# Patient Record
Sex: FEMALE | Race: WHITE | Employment: FULL TIME | URBAN - METROPOLITAN AREA
[De-identification: names, ages, dates, MRNs, and addresses within clinical notes are randomized per-mention and may not be internally consistent; named-entity substitution may affect disease eponyms.]

---

## 2021-06-21 ENCOUNTER — HOSPITAL ENCOUNTER (OUTPATIENT)
Dept: PHYSICAL THERAPY | Age: 56
Discharge: HOME | End: 2021-06-21
Payer: COMMERCIAL

## 2021-06-21 PROCEDURE — 97110 THERAPEUTIC EXERCISES: CPT | Performed by: PHYSICAL THERAPIST

## 2021-06-21 PROCEDURE — 97162 PT EVAL MOD COMPLEX 30 MIN: CPT | Performed by: PHYSICAL THERAPIST

## 2021-06-21 NOTE — PROGRESS NOTES
PT  EVAL AND TREATMENT    Patient Name: Charles Hernandez  Date:2021  : 1965  [x]  Patient  Verified  Payor: Wilbur Earing / Plan: Doll Ingles / Product Type: HMO /    In time:1030am  Out time:1123  Total Treatment Time (min): 53  Total Timed Codes (min): 53  1:1 Treatment Time ( only): 48   Visit #: 1 of 8-10    Treatment Area: Acute bilateral low back pain with sciatica [M54.40]  Pain in: 2  Pain out 3    Objective evaluation:  Physical Therapy Evaluation - Lumbar Spine ([facility])    SUBJECTIVE  Chief Complaint: Pt present with c/o low back pain radiating into the L buttock, which began about 1.5 yrs ago after bending fwd and painting cabinets. Denies pain in post thigh to knee. Pt reports pain increases with bending fwd (vacuuming, cleaning bathroom), prolonged sitting and sleeping, and decreases with walking, and changing positions. Sleeps on R side with pillow between knees. Pt states pain has been constant since the incident 1.5 yrs ago. X-rays reveal beginning states of OA. Denies numbness/tingling. Pain is throbbing in nature. Pain levels range from 3/4 to 6/7. Prior treatments; massage therapy (1 visit). Negative for red flags   Pt reports walking every other day, approx 1 hr at a time with no increase in sx.      OBJECTIVE  Posture:  Lateral Shift: [] R    [] L     [] +  [x] -  Kyphosis: [] Increased [] Decreased   [x]  WNL  Lordosis:  [] Increased [] Decreased   [x] WNL  Pelvic symmetry: [] WNL    [] Other:  Gait:  [x] Normal     [] Abnormal:    Active Movements: [] N/A   [] Too acute   [] Other:  ROM % AROM % PROM Comments:pain, area   Forward flexion 40-60 Finger tips 12 in   Mild increase in l/s pain    Extension 20-30 50%  decrease   SB right 20-30 75%     SB left 20-30 75%  Mild increase   Rotation right 5-10 WNL      Rotation left 5-10 WNL  tightness     Repeated Movements : EDU: decreased sx, RFIS: increases sx, static positioning: KAIN  Neuro Screen [x] WNL  Dural Mobility:  SLR Sitting: [x] R    [x] L    [] +    [x] -  @ (degrees):           Supine: [] R    [] L    [] +    [] -  @ (degrees):   Slump Test: [] R    [x] L    [x] +    [] -  @ (degrees): end range  Prone Knee Bend: [x] R    [x] L    [x] +    [] -  mild  Palpation  [] Min  [x] Mod  [] Severe    Location: L piriformis mm , L4, L5,S1 PA glides TTP, L QL mm  Stabilization Tests  Multifidus Test  Strength: glut max: B 4,   Special Tests  Lumbar:  Quadrant:  [x] Pos  [] Neg   [] Flex  [x] Ext (L)  Sacroilliac: (L LE slightly shorter, negative long sit test. Tightness in L QL mm )          Hip: Alcus Cinnamon:  [] R    [x] L    [x] +    [] - mild    Piriformis: [] R    [x] L    [x] +    [] -          Deficits: Eron's: [] R    [] L    [] +    [x] -     Homero: [x] R    [x] L    [x] +    [] - very mild    Hamstrings 90/90: mod tightness B    Gastrocsoleus (to neutral): Right: Left:             Justification for Eval Code Complexity:  Patient History : Thyroid, alcohol use  Examination see exam as above   Clinical Presentation: evolving  Clinical Decision Making : FOTO : 70 /100    Patient Education: [x] Established HEP    [x] POT (minutes) :15 min HEP charged as therex    ASSESSMENT  [x]  See Plan of Care    PLAN  [x]  Upgrade activities as tolerated     [x] Other:_ POC  Patient to be seen 2 /wk for 8-10 treatments.        Minesh Beltran, ANNMARIE 6/21/2021  10:08 AM

## 2021-06-21 NOTE — PROGRESS NOTES
[id number] [facility] AT [facility]  [address] [place] [address],[address], [address], [address]  Phone [phone]  Fax [phone] / [address] [address] PHYSICAL THERAPY SERVICES  Patient Name: Tosha Beasley : 1965   Medical   Diagnosis: Acute bilateral low back pain with sciatica [M54.40] Treatment Diagnosis: L/s pain with sciatica   Onset Date: 1.5 yrs ago     Referral Source: Rose Claros Start of Care [facility]): 2021   Prior Hospitalization: See medical history Provider #: [id number]   Prior Level of Function: Indep   Comorbidities: Thyroid problems, tobacco use. Medications: Verified on Patient Summary List   The Plan of Care and following information is based on the information from the initial evaluation.   =================================================================================  Assessment / key information:  Patient is a 54 y.o. female who presents to [facility] with Dx of acute low back pain with sciatica. Pt present with c/o low back pain radiating into the L buttock, which began about 1.5 yrs ago after bending fwd and painting cabinets. Denies pain in post thigh to knee. Pt reports pain increases with bending fwd (vacuuming, cleaning bathroom), prolonged sitting and sleeping, and decreases with walking, and changing positions. Sleeps on R side with pillow between knees. Pt states pain has been constant since the incident 1.5 yrs ago. X-rays reveal beginning states of OA. Denies numbness/tingling. Pain is throbbing in nature. Pain levels range from 3/4 to 6/7. Prior treatments; massage therapy (1 visit). Negative for red flags   Pt reports walking every other day, approx 1 hr at a time with no increase in sx.    OBJECTIVE  Posture:  Lateral Shift: [] R    [] L     [] +  [x] -  Kyphosis: [] Increased [] Decreased   [x]  WNL  Lordosis:  [] Increased [] Decreased   [x] WNL  Pelvic symmetry: [] WNL    [] Other:  Gait:  [x] Normal     [] Abnormal:    Active Movements: [] N/A   [] Too acute   [] Other:  ROM % AROM % PROM Comments:pain, area   Forward flexion 40-60 Finger tips 12 in   Mild increase in l/s pain    Extension 20-30 50%  decrease   SB right 20-30 75%     SB left 20-30 75%  Mild increase   Rotation right 5-10 WNL      Rotation left 5-10 WNL  tightness     Repeated Movements : EDU: decreased sx, RFIS: increases sx, static positioning: KAIN mild increase in compressive forces. Neuro Screen [x] WNL  Dural Mobility:  SLR Sitting: [x] R    [x] L    [] +    [x] -  @ (degrees):           Supine: [] R    [] L    [] +    [] -  @ (degrees):   Slump Test: [] R    [x] L    [x] +    [] -  @ (degrees): end range  Prone Knee Bend: [x] R    [x] L    [x] +    [] -  mild  Palpation  [] Min  [x] Mod  [] Severe    Location: L piriformis mm , L4, L5,S1 PA glides TTP, L QL mm  Stabilization Tests  Multifidus Test  Strength: glut max: B 4,   Special Tests  Lumbar:  Quadrant:  [x] Pos  [] Neg   [] Flex  [x] Ext (L)  Sacroilliac: (L LE slightly shorter, negative long sit test. Tightness in L QL mm )          Hip: Gilles Reagan:  [] R    [x] L    [x] +    [] - mild    Piriformis: [] R    [x] L    [x] +    [] -          Deficits: Eron's: [] R    [] L    [] +    [x] -     Homero: [x] R    [x] L    [x] +    [] - very mild    Hamstrings 90/90: mod tightness B     Patient scored 70 on FOTO indicating decreased functional activity level and QOL. A home exercise program was demonstrated and provided to address the above objective and functional deficits.  Patient can benefit from PT interventions to improve AROM, strength/atability, decrease pain and radicular sx, to facilitate ADLs & overall functional status.   =================================================================================  Eval Complexity: History: MEDIUM  Complexity : 1-2 comorbidities / personal factors will impact the outcome/ POC Exam:MEDIUM Complexity : 3 Standardized tests and measures addressing body structure, function, activity limitation and / or participation in recreation  Presentation: MEDIUM Complexity : Evolving with changing characteristics  Clinical Decision Making:MEDIUM Complexity : FOTO score of 26-74Overall Complexity:MEDIUM  Problem List: pain affecting function, decrease ROM, decrease strength, decrease ADL/ functional abilitiies, decrease activity tolerance and decrease flexibility/ joint mobility   Treatment Plan may include any combination of the following: Therapeutic exercise, Therapeutic activities, Neuromuscular re-education, Physical agent/modality, Gait/balance training, Manual therapy, Aquatic therapy, Patient education, Self Care training, Functional mobility training, Home safety training and Stair training  Patient / Family readiness to learn indicated by: asking questions, trying to perform skills and interest  Persons(s) to be included in education: patient (P)  Barriers to Learning/Limitations: None  Measures taken:    Patient Goal (s): Learn new strategies to reduce pain   Patient self reported health status: good  Rehabilitation Potential: good   Short Term Goals: To be accomplished in  2  weeks:  1. Patient will be compliant with HEP for sx management to address the above listed deficits. Levi Narayan Long Term Goals: To be accomplished in  8-10  treatments:  1. Patient to be independent & compliant with HEP in preparation for D/C.   2. Patient to increase FOTO score to 76 indicating improved functional abilities and QOL. 3. Patient to report avg pain <= to 2 to facilitate fwd bending chores and reduced sleep disturbances. 4. Patient to report 75% reduction in post gluteal pain and sx, indicting centralization or decreased nerve irritations. 5. Patient to be [id number] in and demo proper lifting techniques to reduce stress to l/s.    Frequency / Duration:   Patient to be seen  2  times per week for 8-10  treatments:  Patient / Caregiver education and instruction: activity modification and exercises    Therapist Signature: Chucho Perkins PT Date: 8/03/2860   Certification Period:  Time: 10:09 AM   ===========================================================================================  I certify that the above Physical Therapy Services are being furnished while the patient is under my care. I agree with the treatment plan and certify that this therapy is necessary. Physician Signature:        Date:       Time:     Please sign and return to [facility] or you may fax the signed copy to [phone]. Thank you.                                         Danny Muhammad,*    Allergies checked: y

## 2021-06-24 ENCOUNTER — HOSPITAL ENCOUNTER (OUTPATIENT)
Dept: PHYSICAL THERAPY | Age: 56
Discharge: HOME | End: 2021-06-24
Payer: COMMERCIAL

## 2021-06-24 PROCEDURE — 97110 THERAPEUTIC EXERCISES: CPT | Performed by: PHYSICAL THERAPIST

## 2021-06-24 PROCEDURE — 97112 NEUROMUSCULAR REEDUCATION: CPT | Performed by: PHYSICAL THERAPIST

## 2021-06-24 NOTE — PROGRESS NOTES
PT DAILY TREATMENT NOTE     Patient Name: Cesia Rabago  Date:2021  : 1965  [x]  Patient  Verified  Payor: Melchor Odonnell / Plan: Anastacio Doty / Product Type: HMO /    In time:215pm  Out time:310pm  Total Treatment Time (min): 55min   Total Timed Codes (min): 55min  1:1 Treatment Time (min): 50min   Visit #: 2 of 8-10    Treatment Area: Acute bilateral low back pain with sciatica [M54.40]    SUBJECTIVE  Pain Level (0-10 scale): 0 today  Any medication changes, allergies to medications, adverse drug reactions, diagnosis change, or new procedure performed?: [x] No    [] Yes (see summary sheet for update)  Subjective functional status/changes:   [] No changes reported  Im not having much pain today. Pt reports she felt a little sore in the belly after doing the exercises,\" but in a good way, like I used them. \"     OBJECTIVE  Modality rationale: decrease inflammation, decrease pain, increase tissue extensibility and increase muscle contraction/control to improve the patients ability to perform functional mobility and improve activity  endurance   Min Type Additional Details    [] Estim: []Att   []Unatt  []TENS instruct                 []IFC  []Premod []NMES                       []Other:  []w/US   []w/ice   []w/heat  Position:  Location:    []  Traction: [] Cervical       []Lumbar                       [] Prone          []Supine                       []Intermittent   []Continuous Lbs:  [] before manual  [] after manual    []  Ultrasound: []Continuous   [] Pulsed                           []1MHz   []3MHz Location:  W/cm2:    []  Iontophoresis with dexamethasone         Location: [] Take home patch   [] In clinic   PD []  Ice     []  heat  []  Ice massage Position:  Location:    []  Vasopneumatic Device:  If using vaso (only need to measure limb vaso being performed on)      pre-treatment girth :       post-treatment girth :       measured at (landmark location)  Pressure: [] lo [] med [] hi Temp: [] lo [] med [] hi   [] Skin assessment post-treatment:  []intact []redness- no adverse reaction       []redness - adverse reaction:       45/40 min Therapeutic Exercise:  [] See flow sheet :   Rationale: increase ROM, increase strength, improve coordination and increase proprioception to improve the patients ability to perform functional mobility and improve activity  endurance       min Therapeutic Activity:  []  See flow sheet :   Rationale:      10 min Neuromuscular Re-education:  []  See flow sheet :   Rationale: increase strength, improve coordination and increase proprioception  to improve the patients ability to perform functional mobility and improve activity  endurance    NI min Manual Therapy:     Rationale: decrease pain, increase ROM, increase tissue extensibility, decrease trigger points and increase postural awareness to perform functional mobility and improve activity  endurance    [The manual therapy interventions were performed at a separate and distinct time from the therapeutic activities interventions]            x min Patient Education: [x] Review HEP    [] Progressed/Changed HEP based on:   [] positioning   [] body mechanics   [] transfers   [] heat/ice application        Other Objective/Functional Measures:   Initiated POC   Noted fatigue with clams on L (I,II), challenged by prone glut strengthening   No TTP in L piriformis or L QL mm today    Pain Level (0-10 scale) post treatment: 0    ASSESSMENT/Changes in Function: Good tolerance to treatment today with patient req 100% verbal/tactile cueing and demo for proper form/technique with all newly introduced therex. Pt reports decreased pain over last couple of days and mild DOMS.    Patient will continue to benefit from skilled PT services to modify and progress therapeutic interventions, address functional mobility deficits, address ROM deficits, address strength deficits, analyze and address soft tissue restrictions, analyze and cue movement patterns, analyze and modify body mechanics/ergonomics and assess and modify postural abnormalities to attain remaining goals. [x]  See Plan of Care  []  See progress note/recertification  []  See Discharge Summary         Progress towards goals / Updated goals:  FIRST FU  · Short Term Goals: To be accomplished in  2  weeks:  1. Patient will be compliant with HEP for sx management to address the above listed deficits. Pt reports compliance 6/24/21     · Long Term Goals: To be accomplished in  8-10  treatments:  1. Patient to be independent & compliant with HEP in preparation for D/C.   2. Patient to increase FOTO score to 76 indicating improved functional abilities and QOL. 3. Patient to report avg pain <= to 2 to facilitate fwd bending chores and reduced sleep disturbances. 4. Patient to report 75% reduction in post gluteal pain and sx, indicting centralization or decreased nerve irritations. 5. Patient to be educated in and demo proper lifting techniques to reduce stress to l/s.      PLAN  []  Upgrade activities as tolerated     []  Continue plan of care  []  Update interventions per flow sheet       []  Discharge due to:_  []  Other:_      Janneth Duarte, PT 6/24/2021  9:21 AM

## 2021-06-29 ENCOUNTER — HOSPITAL ENCOUNTER (OUTPATIENT)
Dept: PHYSICAL THERAPY | Age: 56
Discharge: HOME | End: 2021-06-29
Payer: COMMERCIAL

## 2021-06-29 PROCEDURE — 97112 NEUROMUSCULAR REEDUCATION: CPT | Performed by: PHYSICAL THERAPIST

## 2021-06-29 PROCEDURE — 97110 THERAPEUTIC EXERCISES: CPT | Performed by: PHYSICAL THERAPIST

## 2021-06-29 NOTE — PROGRESS NOTES
PT DAILY TREATMENT NOTE     Patient Name: Aylin Kulkarni  Date:2021  : 1965  [x]  Patient  Verified  Payor: Alannah Sellers / Plan: Lesa Felty / Product Type: HMO /    In time:1130 Out time:1223  Total Treatment Time (min): 48  Visit #: 3 of 8-10    Medicare/BCBS Only   Total Timed Codes (min):  53 1:1 Treatment Time:  39       Treatment Area: Acute bilateral low back pain with sciatica [M54.40]    SUBJECTIVE  Pain Level (0-10 scale): 0/10  Any medication changes, allergies to medications, adverse drug reactions, diagnosis change, or new procedure performed?: [x] No    [] Yes (see summary sheet for update)  Subjective functional status/changes:   [] No changes reported  Patient reports a mild \"twinge\" on left side of low back in lower lumbar region and into L buttock. OBJECTIVE    37 min Therapeutic Exercise:  [x] See flow sheet :   Rationale: increase ROM, increase strength, improve coordination and increase proprioception to improve the patients ability to perform functional mobility and improve activity endurance     15 min Neuromuscular Re-education:  [x]  See flow sheet : Hip abduction/adduction and marches with TA engaged, sidelying clamshells with TB   Rationale: increase strength, improve coordination and increase proprioception  to improve the patients ability to perform functional mobility and improve activity endurance. With   [x] TE   [] TA   [] neuro   [] other: Patient Education: [x] Review HEP    [x] Progressed/Changed HEP based on: prone press ups exercise provided with instruction to patient to perform when she notices radiating symptoms. [] positioning   [] body mechanics   [] transfers   [] heat/ice application    [] other:      Other Objective/Functional Measures:     Pre-tx: radiating symptoms noted in L lower lumbar and L buttock region upon entering clinic.   Positioning: Prone on elbows - no effect  Repeated movement - prone press ups 20x (radiating symptoms into low back are abolished as noted with patient report)  Post-tx: no radiating symptoms noted with ambulation.  to lower lumbar spine - Unrem     Palpation: noted mild stiffness of QL bilat. Pain Level (0-10 scale) post treatment: 0/10    ASSESSMENT/Changes in Function: Patient exhibits centralization of radiating symptoms following repeated extension during prone press ups today. Updated HEP to include prone press ups with patient instructed to perform at home when she notices radiating symptoms. Patient verbalizes understanding. Plan to check compliance for updated HEP next visit. Patient would benefit from continued PT care progress toward PT goals and patient self-management of symptoms. Patient will continue to benefit from skilled PT services to modify and progress therapeutic interventions, address functional mobility deficits, address ROM deficits, address strength deficits, analyze and address soft tissue restrictions, analyze and cue movement patterns, analyze and modify body mechanics/ergonomics and instruct in home and community integration to attain remaining goals. [x]  See Plan of Care  []  See progress note/recertification  []  See Discharge Summary         Progress towards goals / Updated goals:  Short Term Goals: To be accomplished in  2  weeks:  1. Patient will be compliant with HEP for sx management to address the above listed deficits. Pt reports compliance 6/24/21  Long Term Goals: To be accomplished in  8-10  treatments:  Patient to be independent & compliant with HEP in preparation for D/C. Progressing, updated HEP provided 6/29/21   Patient to increase FOTO score to 76 indicating improved functional abilities and QOL. Patient to report avg pain <= to 2 to facilitate fwd bending chores and reduced sleep disturbances. Patient to report 75% reduction in post gluteal pain and sx, indicting centralization or decreased nerve irritations.    Patient to be educated in and demo proper lifting techniques to reduce stress to l/s.     PLAN  [x]  Upgrade activities as tolerated     [x]  Continue plan of care  []  Update interventions per flow sheet       []  Discharge due to:_  [x]  Other: Check compliance with updated HEP ANTHONY Mclean 6/29/2021  11:29 AM    Future Appointments   Date Time Provider Ramirez Mckinney   6/29/2021 11:30 AM Trevor Dumont, [facility]   7/1/2021 10:45 AM Trevor Dumont, [facility]   7/13/2021  9:15 AM GENIE CageT [facility]   7/15/2021 10:45 AM GENIE CageT [facility]   7/20/2021  9:15 AM GENIE CageT [facility]   7/22/2021 10:00 AM Zenon Montes, PT [facility]

## 2021-07-01 ENCOUNTER — APPOINTMENT (OUTPATIENT)
Dept: PHYSICAL THERAPY | Age: 56
End: 2021-07-01
Payer: COMMERCIAL

## 2021-07-13 ENCOUNTER — APPOINTMENT (OUTPATIENT)
Dept: PHYSICAL THERAPY | Age: 56
End: 2021-07-13
Payer: COMMERCIAL

## 2021-07-15 ENCOUNTER — HOSPITAL ENCOUNTER (OUTPATIENT)
Dept: PHYSICAL THERAPY | Age: 56
Discharge: HOME | End: 2021-07-15
Payer: COMMERCIAL

## 2021-07-15 PROCEDURE — 97110 THERAPEUTIC EXERCISES: CPT | Performed by: PHYSICAL THERAPIST

## 2021-07-15 PROCEDURE — 97140 MANUAL THERAPY 1/> REGIONS: CPT | Performed by: PHYSICAL THERAPIST

## 2021-07-15 PROCEDURE — 97112 NEUROMUSCULAR REEDUCATION: CPT | Performed by: PHYSICAL THERAPIST

## 2021-07-15 NOTE — PROGRESS NOTES
PT DAILY TREATMENT NOTE     Patient Name: Titi Wade  Date:7/15/2021  : 1965  [x]  Patient  Verified  Payor: Yuri Dawkins / Plan: [employer] / Product Type: HMO /    In time:1045 Out time: 1135  Total Treatment Time (min): 50  Visit #: 3 of 8-10    Medicare/BCBS Only   Total Timed Codes (min):  50 1:1 Treatment Time:  43     Treatment Area: Acute bilateral low back pain with sciatica [M54.40]    SUBJECTIVE  Pain Level (0-10 scale): 10  Any medication changes, allergies to medications, adverse drug reactions, diagnosis change, or new procedure performed?: [x] No    [] Yes (see summary sheet for update)  Subjective functional status/changes:   [] No changes reported  Patient reports no radiating symptoms into LLE, however she notes a \"twinge\" on her lower back on the left side. OBJECTIVE    18 min Therapeutic Exercise:  [x] See flow sheet :   Rationale: increase ROM, increase strength, improve coordination and increase proprioception to improve the patients ability to perform functional mobility and improve activity endurance     25 min Neuromuscular Re-education:  [x]  See flow sheet : Hip abduction/adduction and marches with TA engaged, sidelying clamshells with TB   Rationale: increase strength, improve coordination and increase proprioception  to improve the patients ability to perform functional mobility and improve activity endurance. 8 min Manual Therapy:  Pelvic assessment - mild L anterior rotation/R posterior rotation noted. Corrected with MET   Rationale: decrease pain to facilitate increased functional mobility in the home and community. With   [x] TE   [] TA   [] neuro   [] other: Patient Education: [x] Review HEP    [] Progressed/Changed HEP based on:   [] positioning   [] body mechanics   [] transfers   [] heat/ice application    [x] other: instructed patient to perform prone press ups exercise if radiating symptoms return.      Other Objective/Functional Measures:   Pelvic assessment: Mild L anterior rotation/R posterior rotation noted. Corrected with MET. Patient notes decreased pain/discomfort with ambulation following manual intervention. Patient tolerates all therex per flow sheet with no increase in radiating symptoms. No change noted with prone press ups exercise during session today. Pain Level (0-10 scale) post treatment: 3/10    ASSESSMENT/Changes in Function:  Patient exhibits centralization of symptoms today with no report of radiating symptoms. Noted pelvic misalignment with MET done to correct. Good response to manual intervention/MET for pelvic misalignment as patient notes no discomfort with ambulation following. Patient education to perform press ups exercise at home if radiating symptoms return. Patient would benefit from continued PT care to progress toward long term goals. Patient will continue to benefit from skilled PT services to modify and progress therapeutic interventions, address functional mobility deficits, address ROM deficits, address strength deficits, analyze and address soft tissue restrictions, analyze and cue movement patterns, analyze and modify body mechanics/ergonomics and instruct in home and community integration to attain remaining goals. [x]  See Plan of Care  []  See progress note/recertification  []  See Discharge Summary         Progress towards goals / Updated goals: · Short Term Goals: To be accomplished in  2  weeks:  1. Patient will be compliant with HEP for sx management to address the above listed deficits. Pt reports compliance 6/24/21  · Long Term Goals: To be accomplished in  8-10  treatments:  1. Patient to be independent & compliant with HEP in preparation for D/C. Progressing, updated HEP provided 6/29/21   2. Patient to increase FOTO score to 76 indicating improved functional abilities and QOL.    3. Patient to report avg pain <= to 2 to facilitate fwd bending chores and reduced sleep disturbances. 4. Patient to report 75% reduction in post gluteal pain and sx, indicting centralization or decreased nerve irritations. 5. Patient to be educated in and demo proper lifting techniques to reduce stress to l/s.     PLAN  [x]  Upgrade activities as tolerated     [x]  Continue plan of care  []  Update interventions per flow sheet       []  Discharge due to:_  [x]  Other:  Check goals [place] Winton Babinski, ANTHONY 7/15/2021  11:29 AM    Future Appointments   Date Time Provider Ramirez Mckinney   7/20/2021  9:15 AM GENIE [facility]sen[facility]   7/22/2021 10:00 AM Jo Cherry PT [facility]

## 2021-07-20 ENCOUNTER — HOSPITAL ENCOUNTER (OUTPATIENT)
Dept: PHYSICAL THERAPY | Age: 56
Discharge: HOME | End: 2021-07-20
Payer: COMMERCIAL

## 2021-07-20 PROCEDURE — 97110 THERAPEUTIC EXERCISES: CPT | Performed by: PHYSICAL THERAPIST

## 2021-07-20 PROCEDURE — 97112 NEUROMUSCULAR REEDUCATION: CPT | Performed by: PHYSICAL THERAPIST

## 2021-07-20 NOTE — PROGRESS NOTES
PT DAILY TREATMENT NOTE     Patient Name: Trenton Decker  Date:2021  : 1965  [x]  Patient  Verified  Payor: Lesli Rhett / Plan: Renae Gomez / Product Type: HMO /    In time:917  Out time: 1001  Total Treatment Time (min): 44  Visit #: 4 of 8-10    Medicare/BCBS Only   Total Timed Codes (min):  44 1:1 Treatment Time:  44     Treatment Area: Acute bilateral low back pain with sciatica [M54.40]    SUBJECTIVE  Pain Level (0-10 scale): 0/10  Any medication changes, allergies to medications, adverse drug reactions, diagnosis change, or new procedure performed?: [x] No    [] Yes (see summary sheet for update)  Subjective functional status/changes:   [] No changes reported  Pt reports no pain, she reports having pain every evening before getting into bed but that could be her mattress. OBJECTIVE    25 min Therapeutic Exercise:  [x] See flow sheet :clams x2, dynamic nerve glide, prone hip extension/knee flexion, windshield wipers   Rationale: increase ROM, increase strength, improve coordination and increase proprioception to improve the patients ability to perform functional mobility and improve activity endurance     19 min Neuromuscular Re-education:  [x]  See flow sheet : front walk aways, PPT progression   Rationale: increase strength, improve coordination and increase proprioception  to improve the patients ability to perform functional mobility and improve activity endurance. NI min Manual Therapy:  Pelvic assessment - mild L anterior rotation/R posterior rotation noted. Corrected with MET   Rationale: decrease pain to facilitate increased functional mobility in the home and community.         With   [x] TE   [] TA   [] neuro   [] other: Patient Education: [x] Review HEP    [] Progressed/Changed HEP based on:   [] positioning   [] body mechanics   [] transfers   [] heat/ice application    [x] other: instructed patient to perform prone press ups exercise if radiating symptoms return. Other Objective/Functional Measures:   Pelvic assessment: level this session     Pain Level (0-10 scale) post treatment: 3/10    ASSESSMENT/Changes in Function:  Pt presents with improved SI alignment. Pt enters without pain and tolerated progressed exercises without increased pain. Continue to progress as tolerated. Patient will continue to benefit from skilled PT services to modify and progress therapeutic interventions, address functional mobility deficits, address ROM deficits, address strength deficits, analyze and address soft tissue restrictions, analyze and cue movement patterns, analyze and modify body mechanics/ergonomics and instruct in home and community integration to attain remaining goals. [x]  See Plan of Care  []  See progress note/recertification  []  See Discharge Summary         Progress towards goals / Updated goals: · Short Term Goals: To be accomplished in  2  weeks:  1. Patient will be compliant with HEP for sx management to address the above listed deficits. Pt reports compliance 6/24/21  · Long Term Goals: To be accomplished in  8-10  treatments:  1. Patient to be independent & compliant with HEP in preparation for D/C. Progressing, updated HEP provided 6/29/21   2. Patient to increase FOTO score to 76 indicating improved functional abilities and QOL. 3. Patient to report avg pain <= to 2 to facilitate fwd bending chores and reduced sleep disturbances. Progressing no pain this session 7/20/21  4. Patient to report 75% reduction in post gluteal pain and sx, indicting centralization or decreased nerve irritations. Progressing no pain this session 7/20/21  5. Patient to be educated in and demo proper lifting techniques to reduce stress to l/s.     PLAN  [x]  Upgrade activities as tolerated     [x]  Continue plan of care  []  Update interventions per flow sheet       []  Discharge due to:_  []  Other:      Arnoldo Smith DPT 7/20/2021  1005 AM    Future Appointments   Date Time Provider Ramirez Mckinney   7/27/2021 10:00 AM Trevor Dumont DPT [facility]

## 2021-07-22 ENCOUNTER — APPOINTMENT (OUTPATIENT)
Dept: PHYSICAL THERAPY | Age: 56
End: 2021-07-22
Payer: COMMERCIAL

## 2021-07-27 ENCOUNTER — HOSPITAL ENCOUNTER (OUTPATIENT)
Dept: PHYSICAL THERAPY | Age: 56
Discharge: HOME | End: 2021-07-27
Payer: COMMERCIAL

## 2021-07-27 PROCEDURE — 97110 THERAPEUTIC EXERCISES: CPT | Performed by: PHYSICAL THERAPIST

## 2021-07-27 NOTE — PROGRESS NOTES
PT DAILY TREATMENT NOTE     Patient Name: Es Conteh  Date:2021  : 1965  [x]  Patient  Verified  Payor: Yesi Ambriz / Plan: Shon Kwan / Product Type: HMO /    In time:1000  Out time: 1045  Total Treatment Time (min): 45  Visit #: 6 of -10    Medicare/BCBS Only   Total Timed Codes (min):  45 1:1 Treatment Time:  43     Treatment Area: Acute bilateral low back pain with sciatica [M54.40]    SUBJECTIVE  Pain Level (0-10 scale): 0/10  Any medication changes, allergies to medications, adverse drug reactions, diagnosis change, or new procedure performed?: [x] No    [] Yes (see summary sheet for update)  Subjective functional status/changes:   [] No changes reported  Pt reports that she is improved significantly 85%. She notes that her pain is overall decreased and since she started doing her exercises before bed she doesn't wake up in the middle of the night in pain and gets more restful sleep. Functional limitations include walking> a couple hours causes an ache in her low back. She notes that when she did that she did her exercises before bed and she didn't have the pain like she used to. Pt denies pain in the last few weeks.      OBJECTIVE    45 min Therapeutic Exercise:  [x] See flow sheet  PT Reassessment, FOTO, updated HEP   Rationale: increase ROM, increase strength, improve coordination and increase proprioception to improve the patients ability to perform functional mobility and improve activity endurance       With   [] TE   [] TA   [] neuro   [] other: Patient Education: [x] Review HEP    [] Progressed/Changed HEP based on:   [] positioning   [] body mechanics   [] transfers   [] heat/ice application    [] other:      Other Objective/Functional Measures:   Pelvic assessment: level this session  B hip strength: is grossly a 4+/5 in all planes  Bridge: full AROM 100%  FOTO improved to 83/100 from 70/100 at evaluation     Pain Level (0-10 scale) post treatment: 0/10    ASSESSMENT/Changes in Function:  Upon reassessment, pt responded + to [facility] extension principle as she no longer has radicular pain in either leg. She presents with level SI this session and improve core/lumbar/hip strength leading to improved tolerance with functional mobility (noted with improved FOTO score). At this time patient requests IND management with updated HEP to trial for 30 days. She will FU in 30 days for final measurements. Patient will continue to benefit from skilled PT services to modify and progress therapeutic interventions, address functional mobility deficits, address ROM deficits, address strength deficits, analyze and address soft tissue restrictions, analyze and cue movement patterns, analyze and modify body mechanics/ergonomics and instruct in home and community integration to attain remaining goals. [x]  See Plan of Care  []  See progress note/recertification  []  See Discharge Summary         Progress towards goals / Updated goals: · Short Term Goals: To be accomplished in  2  weeks:  1. Patient will be compliant with HEP for sx management to address the above listed deficits. Pt reports compliance 7/27/21  · Long Term Goals: To be accomplished in  8-10  treatments:  1. Patient to be independent & compliant with HEP in preparation for D/C. Progressing, updated HEP provided 6/29/21   2. Patient to increase FOTO score to 76 indicating improved functional abilities and QOL. Met 7/27/21  3. Patient to report avg pain <= to 2 to facilitate fwd bending chores and reduced sleep disturbances. Met 7/27/21  4. Patient to report 75% reduction in post gluteal pain and sx, indicting centralization or decreased nerve irritations. Met 7/27/21  5. Patient to be educated in and demo proper lifting techniques to reduce stress to l/s.  Met 7/27/21    PLAN  [x]  Upgrade activities as tolerated     [x]  Continue plan of care  []  Update interventions per flow sheet       []  Discharge due to:_  [x]  Other: Pt to trial self-management for 30 days, will FU for 1 visit for [address] 7/27/2021  102 PM    No future appointments.

## 2021-10-06 NOTE — PROGRESS NOTES
[address] [facility]  [address] [facility] [address] [address],[address], [facility] emma, Delia Kirk  Phone [phone]  Fax  SUMMARY  Patient Name: Es Conteh : 1965   Treatment/Medical Diagnosis: Acute bilateral low back pain with sciatica [M54.40]   Referral Source: Malka Jasmine,*     Date of Initial Visit: 21 Attended Visits: 6 Missed Visits: 2     SUMMARY OF TREATMENT  Pt last seen on 21 for low back pain with sciatica. She was called on 21 to make further FU visits and stated that she was doing really well and was okay to DC. Please DC from PT at this time. RECOMMENDATIONS  Discontinue therapy. Progressing towards or have reached established goals. If you have any questions/comments please contact us directly at [phone]. Thank you for allowing us to assist in the care of your patient.     Therapist Signature: Sweetie Lopez DPT Date: 10/6/21     Time: 3:59 PM